# Patient Record
Sex: FEMALE | Race: WHITE | HISPANIC OR LATINO | URBAN - METROPOLITAN AREA
[De-identification: names, ages, dates, MRNs, and addresses within clinical notes are randomized per-mention and may not be internally consistent; named-entity substitution may affect disease eponyms.]

---

## 2019-07-09 ENCOUNTER — OFFICE VISIT (OUTPATIENT)
Dept: URBAN - METROPOLITAN AREA CLINIC 71 | Facility: CLINIC | Age: 69
End: 2019-07-09
Payer: COMMERCIAL

## 2019-07-09 DIAGNOSIS — H17.89 OTHER CORNEAL SCARS AND OPACITIES: ICD-10-CM

## 2019-07-09 DIAGNOSIS — H25.13 AGE-RELATED NUCLEAR CATARACT, BILATERAL: ICD-10-CM

## 2019-07-09 DIAGNOSIS — H33.311 HORSESHOE TEAR OF RETINA WITHOUT DETACHMENT, RIGHT EYE: ICD-10-CM

## 2019-07-09 DIAGNOSIS — H52.4 PRESBYOPIA: Primary | ICD-10-CM

## 2019-07-09 DIAGNOSIS — H35.342 MACULAR CYST, HOLE, OR PSEUDOHOLE, LEFT EYE: ICD-10-CM

## 2019-07-09 PROCEDURE — 92014 COMPRE OPH EXAM EST PT 1/>: CPT | Performed by: OPTOMETRIST

## 2019-07-09 PROCEDURE — 92310 CONTACT LENS FITTING OU: CPT | Performed by: OPTOMETRIST

## 2019-07-09 ASSESSMENT — INTRAOCULAR PRESSURE
OS: 17
OD: 18

## 2019-07-09 ASSESSMENT — VISUAL ACUITY
OS: 20/20-
OD: 20/20-

## 2019-07-09 NOTE — IMPRESSION/PLAN
Impression: Presbyopia: H52.4. Pt happy with vision in CLs. Uses OTC readers over CLs for near. Glasses about 11 yrs old. Plan: A glasses prescription has been discussed and generated. Decreased/modified astig for adaptation. A contact lens prescription has also been discussed and generated. Kept same as is. Patient to call with any concerns.

## 2019-07-09 NOTE — IMPRESSION/PLAN
Impression: Horseshoe tear of retina without detachment, right eye: H33.311. Suspect. Unusual appearance. Sup temp OD. Plan: Discussed. Refer to retina next available for further evaluation and possible treatment (laser? ).

## 2019-07-09 NOTE — IMPRESSION/PLAN
Impression: Diagnosis: Macular cyst, hole, or pseudohole, left eye. Code: Y72.273. Pseudohole with ERM OS. Vision stable. OCT mac 4/11/17: improved/stable OS. Plan: Continue to monitor [place]. Repeat OCT mac if/when vision decreases or metamorphopsia noticed. Call if worsens.

## 2019-07-12 ENCOUNTER — NEW PATIENT (OUTPATIENT)
Dept: URBAN - METROPOLITAN AREA CLINIC 70 | Facility: CLINIC | Age: 69
End: 2019-07-12
Payer: MEDICARE

## 2019-07-12 DIAGNOSIS — H43.392 OTHER VITREOUS OPACITIES, LEFT EYE: ICD-10-CM

## 2019-07-12 PROCEDURE — 92004 COMPRE OPH EXAM NEW PT 1/>: CPT | Performed by: OPHTHALMOLOGY

## 2019-07-12 PROCEDURE — 92134 CPTRZ OPH DX IMG PST SGM RTA: CPT | Performed by: OPHTHALMOLOGY

## 2019-07-12 ASSESSMENT — INTRAOCULAR PRESSURE
OS: 10
OD: 12

## 2023-01-09 ENCOUNTER — OFFICE VISIT (OUTPATIENT)
Dept: URBAN - METROPOLITAN AREA CLINIC 71 | Facility: CLINIC | Age: 73
End: 2023-01-09
Payer: MEDICARE

## 2023-01-09 DIAGNOSIS — H52.4 PRESBYOPIA: ICD-10-CM

## 2023-01-09 DIAGNOSIS — H17.89 OTHER CORNEAL SCARS AND OPACITIES: ICD-10-CM

## 2023-01-09 DIAGNOSIS — H25.13 AGE-RELATED NUCLEAR CATARACT, BILATERAL: Primary | ICD-10-CM

## 2023-01-09 DIAGNOSIS — H35.342 MACULAR CYST, HOLE, OR PSEUDOHOLE, LEFT EYE: ICD-10-CM

## 2023-01-09 DIAGNOSIS — H43.813 VITREOUS DEGENERATION, BILATERAL: ICD-10-CM

## 2023-01-09 PROCEDURE — 92004 COMPRE OPH EXAM NEW PT 1/>: CPT | Performed by: OPTOMETRIST

## 2023-01-09 ASSESSMENT — KERATOMETRY
OS: 41.88
OD: 40.50

## 2023-01-09 ASSESSMENT — INTRAOCULAR PRESSURE
OD: 17
OS: 15

## 2023-01-09 ASSESSMENT — VISUAL ACUITY
OD: 20/25
OS: 20/30

## 2023-01-09 NOTE — IMPRESSION/PLAN
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 s/p RK OU.  Plan: Continue to observe

## 2023-01-09 NOTE — IMPRESSION/PLAN
Impression: Presbyopia: H52.4. Plan: A glasses prescription has been discussed and generated. Adaptation period expected due to change in astigmatism. Patient to call with any concerns.

## 2023-01-09 NOTE — IMPRESSION/PLAN
Impression: Diagnosis: Macular cyst, hole, or pseudohole, left eye. Code: D51.176. Pseudohole with ERM OS. Vision stable. OCT mac 4/11/17: improved/stable OS. Plan: Continue to monitor Amsler. Repeat OCT mac if/when vision decreases or any metamorphopsia noticed. Call if worsens.

## 2023-01-09 NOTE — IMPRESSION/PLAN
Impression: Diagnosis: Age-related nuclear cataract, bilateral. Code: H25.13. Plan: Cataracts may be affecting vision some, but no surgery is currently recommended. The patient will monitor vision changes and contact us with any decrease in vision. Continue to monitor.

## 2024-03-01 ENCOUNTER — OFFICE VISIT (OUTPATIENT)
Dept: URBAN - METROPOLITAN AREA CLINIC 71 | Facility: CLINIC | Age: 74
End: 2024-03-01
Payer: COMMERCIAL

## 2024-03-01 DIAGNOSIS — H43.813 VITREOUS DEGENERATION, BILATERAL: ICD-10-CM

## 2024-03-01 DIAGNOSIS — H35.342 MACULAR CYST, HOLE, OR PSEUDOHOLE, LEFT EYE: ICD-10-CM

## 2024-03-01 DIAGNOSIS — H52.4 PRESBYOPIA: Primary | ICD-10-CM

## 2024-03-01 DIAGNOSIS — H25.813 COMBINED FORMS OF AGE-RELATED CATARACT, BILATERAL: ICD-10-CM

## 2024-03-01 DIAGNOSIS — H17.89 OTHER CORNEAL SCARS AND OPACITIES: ICD-10-CM

## 2024-03-01 PROCEDURE — 92014 COMPRE OPH EXAM EST PT 1/>: CPT | Performed by: OPTOMETRIST

## 2024-03-01 ASSESSMENT — INTRAOCULAR PRESSURE
OS: 18
OD: 19

## 2024-03-01 ASSESSMENT — VISUAL ACUITY
OS: 20/20
OD: 20/25